# Patient Record
Sex: MALE | Race: WHITE | Employment: OTHER | URBAN - METROPOLITAN AREA
[De-identification: names, ages, dates, MRNs, and addresses within clinical notes are randomized per-mention and may not be internally consistent; named-entity substitution may affect disease eponyms.]

---

## 2017-10-02 ENCOUNTER — TRANSCRIBE ORDERS (OUTPATIENT)
Dept: ADMINISTRATIVE | Facility: HOSPITAL | Age: 72
End: 2017-10-02

## 2017-10-02 DIAGNOSIS — G47.00 INSOMNIA, UNSPECIFIED TYPE: Primary | ICD-10-CM

## 2017-11-07 ENCOUNTER — HOSPITAL ENCOUNTER (OUTPATIENT)
Dept: SLEEP CENTER | Facility: CLINIC | Age: 72
Discharge: HOME | End: 2017-11-07
Payer: MEDICARE

## 2017-11-07 ENCOUNTER — TRANSCRIBE ORDERS (OUTPATIENT)
Dept: SLEEP CENTER | Facility: CLINIC | Age: 72
End: 2017-11-07

## 2017-11-07 DIAGNOSIS — G47.00 INSOMNIA, UNSPECIFIED TYPE: ICD-10-CM

## 2017-11-07 DIAGNOSIS — G47.33 OSA (OBSTRUCTIVE SLEEP APNEA): Primary | ICD-10-CM

## 2017-11-07 NOTE — PROGRESS NOTES
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  He has been taking Ambien on a nightly basis, which is no longer effective  We discussed sleep hygiene as well as cognitive behavioral therapy for insomnia  I suspect that given his history, he will have a difficult time sleeping without medication  He also has a history of obstructive sleep apnea diagnosed 20 years ago, but could not tolerate CPAP  Further investigation is necessary, but while the patient is experiencing insomnia, it will be a better idea to hold off on any sleep testing  Plan:  Start trazodone 50 mg, 1 to 3 tablets by mouth at bedtime  The medication regimen may require some trial and error  Home sleep testing once the patient's insomnia is improved  Follow up:  8 weeks    History of Present Illness:  74 y o male with a history of sleep initiation insomnia starting over 20 years ago due to work related stress  He generally got up at 4:00 a m  at that time and describes himself as a morning person  He continues to get up at that hour, but has a difficult time falling asleep  He has been on Ambien 10 mg nightly, which is beginning to work less effectively  He had used Ambien 5 mg for many years with good results  He complains of daytime sleepiness and poor concentration  His sleep hygiene is unremarkable for vigorous activity in the evening  He reduces bright light exposure and sleeps in a comfortable environment  He does drink decaffeinated coffee after dinner, which I recommended that he stop  He feels that chronic pain is contributing to his insomnia  He has chronic arthritis including pain in his shoulder  He was evaluated for obstructive sleep apnea 20 years ago and reports that testing demonstrated mild to moderate obstructive sleep apnea    He was unable to tolerate CPAP and no further follow-up was

## 2021-08-12 PROBLEM — K21.9 GASTROESOPHAGEAL REFLUX DISEASE WITHOUT ESOPHAGITIS: Status: ACTIVE | Noted: 2021-08-12

## 2021-08-12 PROBLEM — M47.816 SPONDYLOSIS OF LUMBAR REGION WITHOUT MYELOPATHY OR RADICULOPATHY: Status: ACTIVE | Noted: 2021-08-12

## 2021-08-12 PROBLEM — M85.89 OSTEOPENIA OF MULTIPLE SITES: Status: ACTIVE | Noted: 2021-08-12

## 2021-08-12 PROBLEM — M47.812 CERVICAL SPONDYLOSIS WITHOUT MYELOPATHY: Status: ACTIVE | Noted: 2021-08-12

## 2021-08-12 PROBLEM — M17.0 PRIMARY OSTEOARTHRITIS OF BOTH KNEES: Status: ACTIVE | Noted: 2021-08-12

## 2021-08-12 PROBLEM — M88.9 PAGET DISEASE OF BONE: Status: ACTIVE | Noted: 2021-08-12

## 2021-08-12 PROBLEM — M15.0 PRIMARY GENERALIZED (OSTEO)ARTHRITIS: Status: ACTIVE | Noted: 2021-08-12

## 2022-10-04 PROBLEM — N40.0 BENIGN PROSTATIC HYPERPLASIA WITHOUT LOWER URINARY TRACT SYMPTOMS: Status: ACTIVE | Noted: 2022-10-04

## 2024-09-12 ENCOUNTER — TELEPHONE (OUTPATIENT)
Age: 79
End: 2024-09-12

## 2024-10-28 ENCOUNTER — OFFICE VISIT (OUTPATIENT)
Age: 79
End: 2024-10-28
Payer: MEDICARE

## 2024-10-28 VITALS
OXYGEN SATURATION: 98 % | WEIGHT: 155 LBS | BODY MASS INDEX: 28.52 KG/M2 | DIASTOLIC BLOOD PRESSURE: 68 MMHG | HEART RATE: 76 BPM | TEMPERATURE: 97.6 F | HEIGHT: 62 IN | SYSTOLIC BLOOD PRESSURE: 124 MMHG

## 2024-10-28 DIAGNOSIS — M88.9 PAGET DISEASE OF BONE: Primary | ICD-10-CM

## 2024-10-28 DIAGNOSIS — K22.70 BARRETT'S ESOPHAGUS WITHOUT DYSPLASIA: ICD-10-CM

## 2024-10-28 DIAGNOSIS — E55.9 VITAMIN D INSUFFICIENCY: ICD-10-CM

## 2024-10-28 DIAGNOSIS — M85.89 OSTEOPENIA OF MULTIPLE SITES: ICD-10-CM

## 2024-10-28 DIAGNOSIS — K21.9 GASTROESOPHAGEAL REFLUX DISEASE WITHOUT ESOPHAGITIS: ICD-10-CM

## 2024-10-28 DIAGNOSIS — M15.0 PRIMARY GENERALIZED (OSTEO)ARTHRITIS: ICD-10-CM

## 2024-10-28 PROCEDURE — 99214 OFFICE O/P EST MOD 30 MIN: CPT | Performed by: INTERNAL MEDICINE

## 2024-10-28 RX ORDER — AA/PROT/LYSINE/METHIO/VIT C/B6 50-12.5 MG
133 TABLET ORAL DAILY
COMMUNITY

## 2024-10-28 RX ORDER — AMOXICILLIN 500 MG/1
CAPSULE ORAL
COMMUNITY
Start: 2024-08-14

## 2024-10-28 NOTE — ASSESSMENT & PLAN NOTE
Esophagus without dysplasia.  He continues on omeprazole with good control of GERD.  Follow-up GI.  Continue to monitor.

## 2024-10-28 NOTE — ASSESSMENT & PLAN NOTE
GERD generally stable on omeprazole.  Patient is not a great candidate for nonsteroidals in view of GERD and Alves's esophagus without dysplasia and he did have gastritis with ibuprofen in the past.  Encourage patient to use Arthritis Strength Tylenol 2 tabs q.12 hours.  Follow-up primary care and GI.

## 2024-10-28 NOTE — ASSESSMENT & PLAN NOTE
Primary generalized osteoarthritis status post left total shoulder replacement and bilateral total knee replacements with benefit.  Back symptoms intermittently bothersome with no radicular symptoms.  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  He is using Voltaren gel with some benefit.  He is able to sleep most nights without pain.  He has history of rotator cuff tear, and would only be a candidate for reverse shoulder arthroplasty in view of that.  I did tell the patient that if his quality of life is affected by his shoulder pain, that he should consider reverse shoulder arthroplasty.  Encourage arthritis strength Tylenol if needed for symptomatic relief.  He is not a good candidate for nonsteroidals in view of his history of GERD and Alves's esophagus without dysplasia.  Continue home exercise program as tolerated.  Continue to monitor.  Follow-up 1 year or sooner if needed.

## 2024-10-28 NOTE — ASSESSMENT & PLAN NOTE
Osteopenia on DEXA dated 09/4/2024 unable to be compared with prior DEXA as it was done at a different facility.  FRAX risk for hip fracture 3.1% borderline, however, likely due to his age.  He has no history of adult fracture.  There is no history of maternal hip fracture.  Major fracture risk 8.2%.  No need for prescription medication.  Continue calcium and vitamin D supplement.  Continue home exercise program as tolerated.  Monitor DEXA every 2 years.  Follow-up 12 months.  Will check serum calcium and vitamin D level with the lab work prior to the office visit.  Continue to monitor.

## 2024-10-28 NOTE — PATIENT INSTRUCTIONS
Continue medications as before.  Continue home exercise program.  There is no need to treat your osteopenia.  We will monitor your DEXA every 2 years.  I did put in a request for lab work to get in the beginning of October next year.  I will see you later in October.  If you have any issues call the office.  With regard to your shoulder, if it affects the quality of your life, you certainly should consider a reverse total shoulder.

## 2024-10-28 NOTE — ASSESSMENT & PLAN NOTE
Paget's stable with normal bone specific alkaline phosphatase from September 2023.  Current lab work from 9/28/2024 significant for alkaline phosphatase 66 with calcium 9.3 and CRP 4.5..  Continue to monitor bone-specific alk phos levels.  DEXA 9/4/2024 significant for osteopenia.  No comparison could be done since it was at a different facility.  FRAX risk for hip fracture borderline at 3.1 likely due to age with major fracture risk 8.2%.  No need for prescription medication.  He will continue with calcium and vitamin D supplement and home exercise program as tolerated.  Follow-up in 12 months with lab work prior to the office visit to monitor serum calcium, renal function, and bone specific alk phos.  Check vitamin D with next lab work.

## 2024-10-28 NOTE — PROGRESS NOTES
Assessment/Plan:    Paget disease of bone  Paget's stable with normal bone specific alkaline phosphatase from September 2023.  Current lab work from 9/28/2024 significant for alkaline phosphatase 66 with calcium 9.3 and CRP 4.5..  Continue to monitor bone-specific alk phos levels.  DEXA 9/4/2024 significant for osteopenia.  No comparison could be done since it was at a different facility.  FRAX risk for hip fracture borderline at 3.1 likely due to age with major fracture risk 8.2%.  No need for prescription medication.  He will continue with calcium and vitamin D supplement and home exercise program as tolerated.  Follow-up in 12 months with lab work prior to the office visit to monitor serum calcium, renal function, and bone specific alk phos.  Check vitamin D with next lab work.    Osteopenia of multiple sites  Osteopenia on DEXA dated 09/4/2024 unable to be compared with prior DEXA as it was done at a different facility.  FRAX risk for hip fracture 3.1% borderline, however, likely due to his age.  He has no history of adult fracture.  There is no history of maternal hip fracture.  Major fracture risk 8.2%.  No need for prescription medication.  Continue calcium and vitamin D supplement.  Continue home exercise program as tolerated.  Monitor DEXA every 2 years.  Follow-up 12 months.  Will check serum calcium and vitamin D level with the lab work prior to the office visit.  Continue to monitor.     Primary generalized (osteo)arthritis  Primary generalized osteoarthritis status post left total shoulder replacement and bilateral total knee replacements with benefit.  Back symptoms intermittently bothersome with no radicular symptoms.  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  He is using Voltaren gel with some benefit.  He is able to sleep most nights without pain.  He has history of rotator cuff tear, and would only be a candidate for reverse shoulder arthroplasty in view of that.  I did  tell the patient that if his quality of life is affected by his shoulder pain, that he should consider reverse shoulder arthroplasty.  Encourage arthritis strength Tylenol if needed for symptomatic relief.  He is not a good candidate for nonsteroidals in view of his history of GERD and Alves's esophagus without dysplasia.  Continue home exercise program as tolerated.  Continue to monitor.  Follow-up 1 year or sooner if needed.     Alves's esophagus without dysplasia  Esophagus without dysplasia.  He continues on omeprazole with good control of GERD.  Follow-up GI.  Continue to monitor.    Gastroesophageal reflux disease without esophagitis  GERD generally stable on omeprazole.  Patient is not a great candidate for nonsteroidals in view of GERD and Alves's esophagus without dysplasia and he did have gastritis with ibuprofen in the past.  Encourage patient to use Arthritis Strength Tylenol 2 tabs q.12 hours.  Follow-up primary care and GI.         Problem List Items Addressed This Visit       Paget disease of bone - Primary     Paget's stable with normal bone specific alkaline phosphatase from September 2023.  Current lab work from 9/28/2024 significant for alkaline phosphatase 66 with calcium 9.3 and CRP 4.5..  Continue to monitor bone-specific alk phos levels.  DEXA 9/4/2024 significant for osteopenia.  No comparison could be done since it was at a different facility.  FRAX risk for hip fracture borderline at 3.1 likely due to age with major fracture risk 8.2%.  No need for prescription medication.  He will continue with calcium and vitamin D supplement and home exercise program as tolerated.  Follow-up in 12 months with lab work prior to the office visit to monitor serum calcium, renal function, and bone specific alk phos.  Check vitamin D with next lab work.         Relevant Orders    Alkaline phosphatase, bone specific    Comprehensive metabolic panel    Primary generalized (osteo)arthritis     Primary  generalized osteoarthritis status post left total shoulder replacement and bilateral total knee replacements with benefit.  Back symptoms intermittently bothersome with no radicular symptoms.  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  He is using Voltaren gel with some benefit.  He is able to sleep most nights without pain.  He has history of rotator cuff tear, and would only be a candidate for reverse shoulder arthroplasty in view of that.  I did tell the patient that if his quality of life is affected by his shoulder pain, that he should consider reverse shoulder arthroplasty.  Encourage arthritis strength Tylenol if needed for symptomatic relief.  He is not a good candidate for nonsteroidals in view of his history of GERD and Alves's esophagus without dysplasia.  Continue home exercise program as tolerated.  Continue to monitor.  Follow-up 1 year or sooner if needed.          Osteopenia of multiple sites     Osteopenia on DEXA dated 09/4/2024 unable to be compared with prior DEXA as it was done at a different facility.  FRAX risk for hip fracture 3.1% borderline, however, likely due to his age.  He has no history of adult fracture.  There is no history of maternal hip fracture.  Major fracture risk 8.2%.  No need for prescription medication.  Continue calcium and vitamin D supplement.  Continue home exercise program as tolerated.  Monitor DEXA every 2 years.  Follow-up 12 months.  Will check serum calcium and vitamin D level with the lab work prior to the office visit.  Continue to monitor.          Relevant Orders    Comprehensive metabolic panel    Gastroesophageal reflux disease without esophagitis     GERD generally stable on omeprazole.  Patient is not a great candidate for nonsteroidals in view of GERD and Alves's esophagus without dysplasia and he did have gastritis with ibuprofen in the past.  Encourage patient to use Arthritis Strength Tylenol 2 tabs q.12 hours.  Follow-up  primary care and GI.         Alves's esophagus without dysplasia     Esophagus without dysplasia.  He continues on omeprazole with good control of GERD.  Follow-up GI.  Continue to monitor.          Other Visit Diagnoses       Vitamin D insufficiency        Relevant Orders    Vitamin D 25 hydroxy                Reviewed records, labs, and imaging with the patient in detail.  Counseled patient.  Discussion regarding my findings and recommendations.  Office visit with documentation 30 min.    Subjective:      Patient ID: Cameron Carbajal is a 79 y.o. male.    He is here for follow-up of his osteoarthritis, osteopenia, and Paget's disease.  For his Paget's disease he has previously been treated with IV Reclast with his last dose being administered in 2010.  He has a history of osteopenia.  His most recent DEXA scan was done on 9/4/2024 with spine T-score +0.9 likely falsely elevated due to spondylosis.  Left total hip -1.4 with femoral neck -1.8.  Left forearm -1.1.  FRAX risk for hip fracture 3.1% with major fracture risk 8.2%.  Review of DEXA dated 8/25/2022 significant for left total hip T score -1.9 which has decreased 3.2% as compared to his previous scan.  Left femoral neck T score -1.7.  Right total hip T score -1.4 which has decreased 2.4% as compared to his previous scan.  Right femoral neck T score -1.4.  Left forearm T score -1.0 which has decreased 3.9% as compared to his previous scan.  FRAX hip fracture risk 2.6%, major fracture risk 7.5%.    He has a history of osteoarthritis status post total left shoulder replacement and bilateral knee replacements.  He has a history of lumbar degenerative disc disease. He does have some lower back pain however he exercises regularly which helps his symptoms.  He has had increase in right shoulder pain which can be severe at times due to advanced osteoarthritis.  He was evaluated by orthopedic surgery who told him that he would be a candidate for a reverse shoulder  arthroplasty in view of his history of prior rotator cuff tear.  He has been using Voltaren gel with some benefit.  At this time his quality of life is not significantly affected to warrant reverse shoulder arthroplasty, however, if symptoms worsen, he will likely follow-up with orthopedic surgery.    Lab work dated 10/1/2024 significant for CBC unremarkable.  Creatinine 0.70 with estimated GFR 93.  Calcium 9.7.  Lab work dated 9/28/2024 significant for alk phos 66.  Calcium 9.3.  CRP 4.5.  TSH normal.  Review of lab work dated 9/19/2023 significant for bone specific alk phos normal at 13.7.  CRP 1.8.  Sed rate 14.  Hemoglobin A1c 5.7.  Vitamin D dated 9/20/2022 was high normal at 88.           Allergies  Allergies   Allergen Reactions    Acetazolamide Arthralgia    Levofloxacin Other (See Comments)     Other reaction(s): muscle cramps, joint pain    Sulfa Antibiotics Other (See Comments)     Other reaction(s): adverse reaction as a child       Home Medications    Current Outpatient Medications:     amLODIPine (NORVASC) 5 mg tablet, , Disp: , Rfl:     amoxicillin (AMOXIL) 500 mg capsule, TAKE 4 TABLETS BY MOUTH ONE HOUR BEFORE DENTAL PROCEDURE, Disp: , Rfl:     Calcium Ascorbate (VITAMIN C) 500 mg tablet, Take 500 mg by mouth daily, Disp: , Rfl:     finasteride (PROSCAR) 5 mg tablet, , Disp: , Rfl:     Magnesium 400 MG CAPS, Take by mouth, Disp: , Rfl:     Multiple Vitamin (MULTIVITAMINS PO), Take 1 capsule by mouth daily, Disp: , Rfl:     Omega-3 Fatty Acids (fish oil) 1,000 mg, Take 1,000 mg by mouth daily, Disp: , Rfl:     omeprazole (PriLOSEC) 20 mg delayed release capsule, , Disp: , Rfl:     Specialty Vitamins Products (MG Plus Protein) 133 MG TABS, Take 133 mg by mouth daily, Disp: , Rfl:     tamsulosin (FLOMAX) 0.4 mg, , Disp: , Rfl:     Turmeric (QC TUMERIC COMPLEX PO), Take by mouth, Disp: , Rfl:     vitamin B-12 (VITAMIN B-12) 500 mcg tablet, Take 500 mcg by mouth daily, Disp: , Rfl:     zolpidem (AMBIEN)  10 mg tablet, Take 10 mg by mouth daily as needed, Disp: , Rfl:     Calcium Carb-Cholecalciferol (CALCIUM 600 + D PO), Take by mouth, Disp: , Rfl:     cholecalciferol (VITAMIN D3) 25 mcg (1,000 units) tablet, Take 1,000 Units by mouth daily, Disp: , Rfl:     ibuprofen (MOTRIN) 400 mg tablet, Take 400 mg by mouth every 4 (four) hours as needed, Disp: , Rfl:     Lycopene 5 MG CAPS, Take 10 mg by mouth, Disp: , Rfl:     pyridoxine (VITAMIN B6) 100 mg tablet, Take 500 mg by mouth daily, Disp: , Rfl:     Past Medical History  Past Medical History:   Diagnosis Date    BPH (benign prostatic hyperplasia)     GERD (gastroesophageal reflux disease)     Hearing loss     Meningioma (HCC)     ROSALINDA (obstructive sleep apnea)     Osteoarthritis     Osteopenia     Paget's bone disease        Past Surgical History   Past Surgical History:   Procedure Laterality Date    CATARACT EXTRACTION      HAND SURGERY Left     EPL repair    KNEE SURGERY Bilateral     TKR    ROTATOR CUFF REPAIR Right     SHOULDER SURGERY Left     TSR       Family History   Family History   Problem Relation Age of Onset    Arthritis Family     Paget's disease of bone Family     Dementia Family     Diabetes Family     Osteoporosis Family        The following portions of the patient's history were reviewed and updated as appropriate: allergies, current medications, past family history, past medical history, past social history, past surgical history, and problem list.    Review of Systems   Constitutional:  Negative for chills, fatigue and fever.   HENT:  Positive for hearing loss. Negative for sore throat and tinnitus.    Eyes:  Negative for pain and visual disturbance.   Respiratory:  Negative for cough and shortness of breath.    Cardiovascular:  Negative for chest pain and palpitations.   Gastrointestinal:  Negative for abdominal pain, nausea and vomiting.   Genitourinary:  Negative for difficulty urinating.   Musculoskeletal:  Positive for arthralgias. Negative  "for back pain, gait problem, joint swelling, myalgias, neck pain and neck stiffness.   Skin:  Negative for rash.   Neurological:  Negative for dizziness, seizures, weakness, numbness and headaches.   Psychiatric/Behavioral:  Negative for confusion, decreased concentration and sleep disturbance.          Objective:      /68   Pulse 76   Temp 97.6 °F (36.4 °C) (Temporal)   Ht 5' 2\" (1.575 m)   Wt 70.3 kg (155 lb)   SpO2 98%   BMI 28.35 kg/m²          Physical Exam  Vitals reviewed.   Constitutional:       Appearance: Normal appearance.   HENT:      Head: Normocephalic.      Nose:      Comments: Nose and throat unremarkable  Cardiovascular:      Rate and Rhythm: Normal rate and regular rhythm.   Pulmonary:      Breath sounds: Normal breath sounds.   Abdominal:      Palpations: Abdomen is soft.   Musculoskeletal:      Comments: Slightly decreased lateral flexion and rotation neck. Slightly limited abduction and external rotation left shoulder. FROM right shoulder, bilateral elbows, left wrist.  Slightly decreased flexion and extension right wrist.  Interphalangeal OA, squaring 1st CMC joints, Heberden's nodes, Duncan's nodes bilateral hands.  No synovitis noted.  FROM bilateral hips.  Slightly decreased flexion bilateral knees; surgical scars noted.  FROM bilateral ankles.     Skin:     General: Skin is warm and dry.   Neurological:      General: No focal deficit present.      Mental Status: He is alert.               This note was written in part using the assistance of the [employer] Direct [id number] microphone system. Those portions using this system have been dictated and not read.  "

## 2024-12-30 ENCOUNTER — TELEPHONE (OUTPATIENT)
Age: 79
End: 2024-12-30

## 2025-08-12 ENCOUNTER — TELEPHONE (OUTPATIENT)
Age: 80
End: 2025-08-12